# Patient Record
Sex: MALE | Race: BLACK OR AFRICAN AMERICAN | Employment: FULL TIME | URBAN - METROPOLITAN AREA
[De-identification: names, ages, dates, MRNs, and addresses within clinical notes are randomized per-mention and may not be internally consistent; named-entity substitution may affect disease eponyms.]

---

## 2023-02-09 ENCOUNTER — DOCUMENTATION ONLY (OUTPATIENT)
Dept: PULMONOLOGY | Age: 33
End: 2023-02-09

## 2023-02-09 ENCOUNTER — CLINICAL DOCUMENTATION (OUTPATIENT)
Age: 33
End: 2023-02-09

## 2023-02-09 NOTE — PROGRESS NOTES
Left message re new aptient sleep ref from [place]. Has pt had any prior sleep studies/evals? Are they able to travel to [facility] to be seen?  [facility] [place]

## 2023-02-09 NOTE — PROGRESS NOTES
Left message re new aptient sleep ref from [place]. Has pt had any prior sleep studies/evals? Are they able to travel to [facility] to be seen?  [place] good until 7[place]13/23

## 2023-03-23 ENCOUNTER — OFFICE VISIT (OUTPATIENT)
Age: 33
End: 2023-03-23
Payer: OTHER GOVERNMENT

## 2023-03-23 VITALS
TEMPERATURE: 98.8 F | WEIGHT: 206.6 LBS | RESPIRATION RATE: 18 BRPM | BODY MASS INDEX: 30.6 KG/M2 | OXYGEN SATURATION: 95 % | DIASTOLIC BLOOD PRESSURE: 85 MMHG | HEART RATE: 97 BPM | SYSTOLIC BLOOD PRESSURE: 121 MMHG | HEIGHT: 69 IN

## 2023-03-23 DIAGNOSIS — R29.818 SUSPECTED SLEEP APNEA: ICD-10-CM

## 2023-03-23 DIAGNOSIS — Z78.9 USE OF ENERGY DRINKS: ICD-10-CM

## 2023-03-23 DIAGNOSIS — R06.83 SNORING: Primary | ICD-10-CM

## 2023-03-23 DIAGNOSIS — R06.81 WITNESSED EPISODE OF APNEA: ICD-10-CM

## 2023-03-23 DIAGNOSIS — R03.0 ELEVATED BLOOD PRESSURE READING: ICD-10-CM

## 2023-03-23 PROCEDURE — 99203 OFFICE O/P NEW LOW 30 MIN: CPT | Performed by: INTERNAL MEDICINE

## 2023-03-23 RX ORDER — SILDENAFIL 100 MG/1
50 TABLET, FILM COATED ORAL
COMMUNITY
Start: 2022-10-04

## 2023-03-23 ASSESSMENT — PATIENT HEALTH QUESTIONNAIRE - PHQ9
2. FEELING DOWN, DEPRESSED OR HOPELESS: 0
SUM OF ALL RESPONSES TO PHQ9 QUESTIONS 1 & 2: 0
SUM OF ALL RESPONSES TO PHQ QUESTIONS 1-9: 0
1. LITTLE INTEREST OR PLEASURE IN DOING THINGS: 0
SUM OF ALL RESPONSES TO PHQ QUESTIONS 1-9: 0

## 2023-03-23 ASSESSMENT — SLEEP AND FATIGUE QUESTIONNAIRES
HOW LIKELY ARE YOU TO NOD OFF OR FALL ASLEEP WHEN YOU ARE A PASSENGER IN A CAR FOR AN HOUR WITHOUT A BREAK: 0
HOW LIKELY ARE YOU TO NOD OFF OR FALL ASLEEP WHILE WATCHING TV: 3
HOW LIKELY ARE YOU TO NOD OFF OR FALL ASLEEP WHILE SITTING INACTIVE IN A PUBLIC PLACE: 3
HOW LIKELY ARE YOU TO NOD OFF OR FALL ASLEEP WHILE SITTING AND TALKING TO SOMEONE: 0
HOW LIKELY ARE YOU TO NOD OFF OR FALL ASLEEP WHILE SITTING AND READING: 1
HOW LIKELY ARE YOU TO NOD OFF OR FALL ASLEEP WHILE LYING DOWN TO REST IN THE AFTERNOON WHEN CIRCUMSTANCES PERMIT: 2
NECK CIRCUMFERENCE (INCHES): 15
ESS TOTAL SCORE: 11
HOW LIKELY ARE YOU TO NOD OFF OR FALL ASLEEP IN A CAR, WHILE STOPPED FOR A FEW MINUTES IN TRAFFIC: 0
HOW LIKELY ARE YOU TO NOD OFF OR FALL ASLEEP WHILE SITTING QUIETLY AFTER LUNCH WITHOUT ALCOHOL: 2

## 2023-03-23 NOTE — PROGRESS NOTES
Steven Power presents today for   Chief Complaint   Patient presents with    Sleep Problem    Fatigue    New Patient    Snoring       Is someone accompanying this pt? no    Is the patient using any DME equipment during OV? no    -DME Company N/A    Have you ever had a sleep study done before? no    Depression Screening:  PHQ-9  3/23/2023   Little interest or pleasure in doing things 0   Feeling down, depressed, or hopeless 0   PHQ-2 Score 0   PHQ-9 Total Score 0        Ideal Sleepiness Scale:  Sleep Medicine 3/23/2023   Sitting and reading 1   Watching TV 3   Sitting, inactive in a public place (e.g. a theatre or a meeting) 3   As a passenger in a car for an hour without a break 0   Lying down to rest in the afternoon when circumstances permit 2   Sitting and talking to someone 0   Sitting quietly after a lunch without alcohol 2   In a car, while stopped for a few minutes in traffic 0   Ideal Sleepiness Score 11   Neck circumference (Inches) 15       Stop-Bang:  STOP-BANG QUESTIONNAIRE 3/23/2023   Are you a loud and/or regular snorer? 1   Do you often feel tired or groggy upon awakening or do you awaken with a headache? 1   Have you been observed to gasp or stop breathing during sleep? 1   Are you often tired or fatigued during wake time hours? 0   Do you fall asleep sitting, reading, watching TV or driving? 0   Do you often have problems with memory or concentration? 0   Do you have or are you being treated for high blood pressure? 0   Recent BMI (Calculated) 30.6   Is BMI greater than 35 kg/m2? 0=No   Age older than 48years old? 0=No   Is your neck circumference greater than 17 inches (Male) or 16 inches (Female)? 0   Gender - Male 1=Yes   STOP-Bang Total Score 34.6         Coordination of Care:  1. Have you been to the ER, urgent care clinic since your last visit? Hospitalized since your last visit? no    2.  Have you seen or consulted any other health care providers outside of the [facility]
[address] Doesn't drink during the week   Tobacco Never     Drugs None     Other: None         Medications:  Current Outpatient Medications   Medication Sig Dispense Refill    sildenafil (VIAGRA) 100 MG tablet 50 mg       No current facility-administered medications for this visit. Allergy:  Not on File      Review of Systems  General ROS: positive for  - sleep disturbance  negative for - chills, fever, hot flashes, malaise, night sweats, weight gain, or weight loss  ENT ROS: negative for - epistaxis, headaches, hearing change, nasal congestion, nasal discharge, nasal polyps, oral lesions, sinus pain, sneezing, sore throat, tinnitus, vertigo, or visual changes  Hematological and Lymphatic ROS: negative for - bleeding problems, blood clots, bruising, jaundice, pallor or swollen lymph nodes  Endocrine ROS: negative for - polydipsia/polyuria, skin changes, temperature intolerance or unexpected weight changes  Respiratory ROS: no cough, shortness of breath, or wheezing  Cardiovascular ROS: no chest pain or dyspnea on exertion  Gastrointestinal ROS: no abdominal pain, change in bowel habits, or black or bloody stools  Genito-Urinary ROS: no dysuria, trouble voiding, or hematuria  Musculoskeletal ROS: negative  Neurological ROS: no TIA or stroke symptoms  Dermatological ROS: negative for - pruritus, rash or skin lesion changes   Psychological ROS: as noted above   Otherwise negative and per HPI      Physical Exam:    Vitals:    03/23/23 0936   BP: (!) 139/91   Site: Left Upper Arm   Position: Sitting   Cuff Size: Large Adult   Pulse: 97   Resp: 18   Temp: 98.8 °F (37.1 °C)   TempSrc: Temporal   SpO2: 95%   Weight: 206 lb 9.6 oz (93.7 kg)   Height: 5' 9\" (1.753 m)    on RA, Body mass index is 30.51 kg/m².           General: No distress, acyanotic, appears stated age, cooperative, pleasant  HEENT: PERRL, EOMI, throat without erythema or exudate, Tongue- dental indention on tongue, Mallampati's score 3+, Uvula- midline,

## 2023-03-23 NOTE — PATIENT INSTRUCTIONS
Please call our clinic back at [phone] or send a message on [employer] if you have any questions or concerns or if you are experiencing any of the following: You have not received a follow up appointment within 30 days prior the recommended follow up time. If you are not tolerating treatment plan and/or not able to obtain equipment or prescribed medication(s). if you are experiencing any difficulties with the [employer]  (DME) Company you may be using or is assigned to you. Two weeks have passed and you have not received an appointment for a scheduled procedure. Two weeks have passed since you underwent a test and/or procedure and you have not received your results. If you are using a CPAP/BIPAP, or Home Ventilator Device- Please note the following. Currently, many DMEs are experiencing supply chain difficulties and orders for equipment may be back logged several weeks to months. Your  Durable Medical Equipment (DME ) company is supposed to provide you with replacement filters, tubing and masks. You can either call your DME when you need new supplies or you can arrange for an automatic shipment schedule. Your need to be seen by our office at lat minimum of every 12 months in order to renew the prescription for these supplies. Please make note of who your DME company is and their phone number. Please make sure that you clean your mask and hosing on a regular basis. Your DME can provide you with additional information regarding proper care and cleaning of your device           Safety is strongly encouraged. You should not drive if sleepy, tired, distracted and/or  fatigued. If a procedure or imaging study has been ordered for your by this clinic please call the [facility] at [facility] or [facility] at  [address] [place] [address] [place] and blood work do not require appointments.   They are considered \"Walk-In\" services and can be

## 2023-05-04 DIAGNOSIS — G47.33 OSA (OBSTRUCTIVE SLEEP APNEA): Primary | ICD-10-CM

## 2023-05-04 NOTE — PROGRESS NOTES
Sleep testing completed. Please see orders if present. Results located under procedure tab.     Richard Thompson DO, [facility]    [facility]  Pulmonary, Critical Care, and Sleep Medicine

## 2023-05-15 ENCOUNTER — CLINICAL DOCUMENTATION (OUTPATIENT)
Age: 33
End: 2023-05-15

## 2023-06-01 ENCOUNTER — TELEPHONE (OUTPATIENT)
Age: 33
End: 2023-06-01

## 2023-06-01 NOTE — TELEPHONE ENCOUNTER
Pt has questions about an order being sent for his cpap machine pt is requesting a call back at [phone]

## 2023-06-01 NOTE — TELEPHONE ENCOUNTER
Per Katelyn Yo with [facility] wants the patients CPAP machine order to go to the [place].

## 2023-06-01 NOTE — TELEPHONE ENCOUNTER
Patient states he was ordered a CPAP machine that went to [facility]. [facility] called him several weeks ago and asked him if we [facility] the [facility] the [facility] of [facility] needing a [facility] so they would get paid. No contact from [facility] to us about any need to [place].   I will call Norma Padgett to find out what they are needing